# Patient Record
Sex: FEMALE | Race: WHITE
[De-identification: names, ages, dates, MRNs, and addresses within clinical notes are randomized per-mention and may not be internally consistent; named-entity substitution may affect disease eponyms.]

---

## 2018-09-07 ENCOUNTER — HOSPITAL ENCOUNTER (OUTPATIENT)
Dept: HOSPITAL 31 - C.SDS | Age: 28
Discharge: HOME | End: 2018-09-07
Attending: OBSTETRICS & GYNECOLOGY
Payer: COMMERCIAL

## 2018-09-07 VITALS
DIASTOLIC BLOOD PRESSURE: 56 MMHG | OXYGEN SATURATION: 100 % | HEART RATE: 65 BPM | SYSTOLIC BLOOD PRESSURE: 101 MMHG | TEMPERATURE: 97.8 F

## 2018-09-07 VITALS — RESPIRATION RATE: 18 BRPM

## 2018-09-07 VITALS — BODY MASS INDEX: 31.4 KG/M2

## 2018-09-07 DIAGNOSIS — D25.0: ICD-10-CM

## 2018-09-07 DIAGNOSIS — S31.41XA: ICD-10-CM

## 2018-09-07 DIAGNOSIS — N93.9: Primary | ICD-10-CM

## 2018-09-07 DIAGNOSIS — R10.2: ICD-10-CM

## 2018-09-07 PROCEDURE — 12001 RPR S/N/AX/GEN/TRNK 2.5CM/<: CPT

## 2018-09-07 PROCEDURE — 58561 HYSTEROSCOPY REMOVE MYOMA: CPT

## 2018-09-08 NOTE — [id number].[id number]
Surgeon's Initial Post Op Note





- Surgeon's Notes


Surgeon: Regina Matamoros MD


Assistant: none


Type of Anesthesia: General LMA


Pre-Operative Diagnosis: Abnormal uterine bleeding, submucosl myoma


Operative Findings: narrow hymen, thick white enodmetrium wiht submucosal myoma

, superificial perineal 1st degree laceration repaired 2o chormic


Post-Operative Diagnosis: same as above


Operation Performed: Hysterosocpy myomecotmy, dilation and currettage, repair 

of perineal laceration


Specimen/Specimens Removed: endometial currettings, submuscoa myoma


Estimated Blood Loss: EBL {In ML}: 5


Blood Products Given: N/A


Drains Used: No Drains


Post-Op Condition: Good


Date of Surgery/Procedure: 09/08/18


Time of Surgery/Procedure: 13:00

## 2018-09-10 NOTE — OP
PROCEDURE DATE:  09/07/2018



PREOPERATIVE DIAGNOSIS:  Abnormal uterine bleeding, submucosal myoma.



POSTOPERATIVE DIAGNOSIS:  Abnormal uterine bleeding, submucosal myoma.



SURGEON:  Regina Matamoros MD.



ASSISTANT:  None.



TYPE OF ANESTHESIA:  General LMA.



OPERATIVE FINDINGS:  Narrow hymen, thick and white endometrium with

submucosal myoma.  Superficial perineal first- degree laceration repaired

with 2-0 chromic.



OPERATION PERFORMED:  Hysteroscopic myomectomy, dilation and curettage,

repair of perineal laceration.



SPECIMEN REMOVED:  Endometrial curetting, submucosal myoma.



ESTIMATED BLOOD LOSS:  5 mL.



BLOOD PRODUCTS:  None.



COMPLICATIONS:  None.



DESCRIPTION OF PROCEDURE:  The patient was taken to the operating room

where she was given general anesthesia.  Once it was found to be adequate,

the patient was placed on the operating table in dorsal supine position

with legs supported using stirrups.  The patient was prepped and draped in

the usual sterile fashion.  Time-out confirmed correct patient and correct

procedure.  A Joshi retractor was placed in anterior posterior fornix of the

vagina.  The hymen was noted to be very narrow.  Cervix was adequately

visualized.  A single-tooth tenaculum was placed in the anterior lip of the

cervix.  The uterus was then sounded to 7 cm.  Following this, the cervix

was sequentially dilated to allow for introduction of the hysteroscope

under direct visualization using normal saline as the distention media. 

There was a thick, white endometrium noted with a submucosal myoma.  The

MyoSure device was inserted under direct visualization.  The mass was then

carefully resected.  The MyoSure device was then removed.  The instrument

was then removed.  The gentle curettage was then sent to pathology on

[facility].  All instruments were removed.  There was good hemostasis at the

tenaculum puncture site.  A superficial first-degree perineal laceration

was noted, repaired with a 2-0 chromic.  Good hemostasis noted.  At the end

of the procedure, all needle, sponge, and instrument counts were noted and

correct x2.  The patient tolerated the procedure well and was transferred

to the recovery room in stable condition.





__________________________________________

Regina Matamoros MD





DD:  09/08/2018 8:48:03

DT:  09/08/2018 9:33:06

Job # [id number]